# Patient Record
Sex: FEMALE | HISPANIC OR LATINO | URBAN - METROPOLITAN AREA
[De-identification: names, ages, dates, MRNs, and addresses within clinical notes are randomized per-mention and may not be internally consistent; named-entity substitution may affect disease eponyms.]

---

## 2023-08-17 ENCOUNTER — OFFICE VISIT (OUTPATIENT)
Dept: URBAN - METROPOLITAN AREA CLINIC 19 | Facility: CLINIC | Age: 53
End: 2023-08-17

## 2023-08-17 NOTE — HPI-TODAY'S VISIT:
Colonoscopy was done by Dr. Yoon on 830 2018-5 mm polyp in the ascending colon, significant colonic spasm, haustrations increase, exam was otherwise normal, ileum normal.  Path tubular adenoma 5-year follow-up given EGD 10/22/2014 done at [facility] in [place], [place]-normal esophagus, normal stomach, GE junction was slightly irregular, normal duodenum.  Path: Chronic inflammation of gastric type mucosa negative for intestinal metaplasia negative for dysplasia squamous mucosa with moderate reflux changes Since upon referral from Meena Peres PA-C.  A copy of this report will be sent to the referring provider Due for screening colonoscopy and would like endoscopy due to history of GERD Refer to Dr. Paz

## 2023-09-27 ENCOUNTER — DASHBOARD ENCOUNTERS (OUTPATIENT)
Age: 53
End: 2023-09-27

## 2023-09-27 ENCOUNTER — OFFICE VISIT (OUTPATIENT)
Dept: URBAN - METROPOLITAN AREA CLINIC 78 | Facility: CLINIC | Age: 53
End: 2023-09-27
Payer: SELF-PAY

## 2023-09-27 VITALS
DIASTOLIC BLOOD PRESSURE: 77 MMHG | TEMPERATURE: 98.2 F | SYSTOLIC BLOOD PRESSURE: 115 MMHG | BODY MASS INDEX: 26.07 KG/M2 | RESPIRATION RATE: 14 BRPM | HEART RATE: 67 BPM | HEIGHT: 60 IN | WEIGHT: 132.8 LBS

## 2023-09-27 DIAGNOSIS — K59.02 CONSTIPATION BY OUTLET DYSFUNCTION: ICD-10-CM

## 2023-09-27 DIAGNOSIS — R12 HEARTBURN: ICD-10-CM

## 2023-09-27 DIAGNOSIS — Z86.010 PERSONAL HISTORY OF COLONIC POLYPS: ICD-10-CM

## 2023-09-27 DIAGNOSIS — R10.84 ABDOMINAL CRAMPING, GENERALIZED: ICD-10-CM

## 2023-09-27 PROBLEM — 428283002: Status: ACTIVE | Noted: 2023-09-27

## 2023-09-27 PROBLEM — 14760008: Status: ACTIVE | Noted: 2023-09-27

## 2023-09-27 PROCEDURE — 99244 OFF/OP CNSLTJ NEW/EST MOD 40: CPT | Performed by: INTERNAL MEDICINE

## 2023-09-27 PROCEDURE — 99204 OFFICE O/P NEW MOD 45 MIN: CPT | Performed by: INTERNAL MEDICINE

## 2023-09-27 RX ORDER — DICYCLOMINE HYDROCHLORIDE 10 MG/1
1 CAPSULE 30 MINUTES BEFORE EATING CAPSULE ORAL
Qty: 30 | Refills: 1 | OUTPATIENT

## 2023-09-27 RX ORDER — SODIUM PICOSULFATE, MAGNESIUM OXIDE, AND ANHYDROUS CITRIC ACID 10; 3.5; 12 MG/160ML; G/160ML; G/160ML
160 ML DAY #1 AND 160 ML DAY # 2 LIQUID ORAL ONCE A DAY
Qty: 320 MILLIMETER | OUTPATIENT

## 2023-09-27 NOTE — HPI-TODAY'S VISIT:
Patient was referred by Dr. Ruth Early  A copy of this document will be sent to the physician.   Patient is a 53-year-old pleasant female seen primarily for change in her GI system she is reporting abdominal cramping primarily in the lower abdomen.  She is status post hysterectomy approximately 10 years ago and status post vaginal delivery x1.  Her last colonoscopy was in 2018 which should revealed significant spasm and and 1 polyp.  She reports bowel movements every other day with sense of incomplete evacuation.  She also reports heartburn but no dysphagia.  All the symptoms are relatively new to her.  Otherwise she describes no medical problems in general denies chest pain shortness of breath. There is no family history of colon polyps or cancer. Patient denies change , appetite, and weight. Patient ocassional  bleeding per rectum. Last colonoscopy: 2018 TA    Deneis chest pain  Deneis SOB  Denies easy brusing  Denies anesthesia complication

## 2023-10-12 ENCOUNTER — OFFICE VISIT (OUTPATIENT)
Dept: URBAN - METROPOLITAN AREA SURGERY CENTER 15 | Facility: SURGERY CENTER | Age: 53
End: 2023-10-12

## 2023-10-20 ENCOUNTER — OFFICE VISIT (OUTPATIENT)
Dept: URBAN - METROPOLITAN AREA CLINIC 77 | Facility: CLINIC | Age: 53
End: 2023-10-20

## 2023-11-07 ENCOUNTER — OFFICE VISIT (OUTPATIENT)
Dept: URBAN - METROPOLITAN AREA SURGERY CENTER 15 | Facility: SURGERY CENTER | Age: 53
End: 2023-11-07